# Patient Record
Sex: MALE | Race: WHITE | NOT HISPANIC OR LATINO | Employment: OTHER | URBAN - NONMETROPOLITAN AREA
[De-identification: names, ages, dates, MRNs, and addresses within clinical notes are randomized per-mention and may not be internally consistent; named-entity substitution may affect disease eponyms.]

---

## 2018-10-16 ENCOUNTER — CONSULT (OUTPATIENT)
Dept: CARDIOLOGY | Facility: CLINIC | Age: 83
End: 2018-10-16

## 2018-10-16 VITALS
WEIGHT: 219 LBS | HEART RATE: 78 BPM | SYSTOLIC BLOOD PRESSURE: 140 MMHG | HEIGHT: 69 IN | DIASTOLIC BLOOD PRESSURE: 80 MMHG | BODY MASS INDEX: 32.44 KG/M2

## 2018-10-16 DIAGNOSIS — R12 HEART BURN: ICD-10-CM

## 2018-10-16 DIAGNOSIS — R94.31 ABNORMAL EKG: ICD-10-CM

## 2018-10-16 DIAGNOSIS — R06.02 SHORTNESS OF BREATH: ICD-10-CM

## 2018-10-16 DIAGNOSIS — E88.81 METABOLIC SYNDROME: ICD-10-CM

## 2018-10-16 DIAGNOSIS — I10 ESSENTIAL HYPERTENSION: Primary | ICD-10-CM

## 2018-10-16 DIAGNOSIS — R01.1 MURMUR, CARDIAC: ICD-10-CM

## 2018-10-16 DIAGNOSIS — I25.6 SILENT MYOCARDIAL ISCHEMIA: ICD-10-CM

## 2018-10-16 PROCEDURE — 99204 OFFICE O/P NEW MOD 45 MIN: CPT | Performed by: INTERNAL MEDICINE

## 2018-10-16 PROCEDURE — 93000 ELECTROCARDIOGRAM COMPLETE: CPT | Performed by: INTERNAL MEDICINE

## 2018-10-16 RX ORDER — HYDROCHLOROTHIAZIDE 25 MG/1
25 TABLET ORAL DAILY
COMMUNITY
End: 2019-01-17

## 2018-10-16 RX ORDER — GABAPENTIN 100 MG/1
100 CAPSULE ORAL DAILY
COMMUNITY
End: 2019-01-17

## 2018-10-16 NOTE — PATIENT INSTRUCTIONS
Mediterranean Diet  A Mediterranean diet refers to food and lifestyle choices that are based on the traditions of countries located on the [place]. This way of eating has been shown to help prevent certain conditions and improve outcomes for people who have chronic diseases, like kidney disease and heart disease.  What are tips for following this plan?  Lifestyle  · Cook and eat meals together with your family, when possible.  · Drink enough fluid to keep your urine clear or pale yellow.  · Be physically active every day. This includes:  ? Aerobic exercise like running or swimming.  ? Leisure activities like gardening, walking, or housework.  · Get 7-8 hours of sleep each night.  · If recommended by your health care provider, drink red wine in moderation. This means 1 glass a day for nonpregnant women and 2 glasses a day for men. A glass of wine equals 5 oz (150 mL).  Reading food labels  · Check the serving size of packaged foods. For foods such as rice and pasta, the serving size refers to the amount of cooked product, not dry.  · Check the total fat in packaged foods. Avoid foods that have saturated fat or trans fats.  · Check the ingredients list for added sugars, such as corn syrup.  Shopping  · At the grocery store, buy most of your food from the areas near the walls of the store. This includes:  ? Fresh fruits and vegetables (produce).  ? Grains, beans, nuts, and seeds. Some of these may be available in unpackaged forms or large amounts (in bulk).  ? Fresh seafood.  ? Poultry and eggs.  ? Low-fat dairy products.  · Buy whole ingredients instead of prepackaged foods.  · Buy fresh fruits and vegetables in-season from local farmers markets.  · Buy frozen fruits and vegetables in resealable bags.  · If you do not have access to quality fresh seafood, buy precooked frozen shrimp or canned fish, such as tuna, salmon, or sardines.  · Buy small amounts of raw or cooked vegetables, salads, or olives from the  deli or salad bar at your store.  · Stock your pantry so you always have certain foods on hand, such as olive oil, canned tuna, canned tomatoes, rice, pasta, and beans.  Cooking  · Cook foods with extra-virgin olive oil instead of using butter or other vegetable oils.  · Have meat as a side dish, and have vegetables or grains as your main dish. This means having meat in small portions or adding small amounts of meat to foods like pasta or stew.  · Use beans or vegetables instead of meat in common dishes like chili or lasagna.  · Essig with different cooking methods. Try roasting or broiling vegetables instead of steaming or sautéeing them.  · Add frozen vegetables to soups, stews, pasta, or rice.  · Add nuts or seeds for added healthy fat at each meal. You can add these to yogurt, salads, or vegetable dishes.  · Marinate fish or vegetables using olive oil, lemon juice, garlic, and fresh herbs.  Meal planning  · Plan to eat 1 vegetarian meal one day each week. Try to work up to 2 vegetarian meals, if possible.  · Eat seafood 2 or more times a week.  · Have healthy snacks readily available, such as:  ? Vegetable sticks with hummus.  ? Greek yogurt.  ? Fruit and nut trail mix.  · Eat balanced meals throughout the week. This includes:  ? Fruit: 2-3 servings a day  ? Vegetables: 4-5 servings a day  ? Low-fat dairy: 2 servings a day  ? Fish, poultry, or lean meat: 1 serving a day  ? Beans and legumes: 2 or more servings a week  ? Nuts and seeds: 1-2 servings a day  ? Whole grains: 6-8 servings a day  ? Extra-virgin olive oil: 3-4 servings a day  · Limit red meat and sweets to only a few servings a month  What are my food choices?  · Mediterranean diet  ? Recommended  ? Grains: Whole-grain pasta. Brown rice. Bulgar wheat. Polenta. Couscous. Whole-wheat bread. Oatmeal. Quinoa.  ? Vegetables: Artichokes. Beets. Broccoli. Cabbage. Carrots. Eggplant. Green beans. Chard. Kale. Spinach. Onions. Leeks. Peas. Squash.  Tomatoes. Peppers. Radishes.  ? Fruits: Apples. Apricots. Avocado. Berries. Bananas. Cherries. Dates. Figs. Grapes. Simran. Melon. Oranges. Peaches. Plums. Pomegranate.  ? Meats and other protein foods: Beans. Almonds. Sunflower seeds. Pine nuts. Peanuts. Cod. [place]. Scallops. Shrimp. Tuna. Tilapia. Clams. Oysters. Eggs.  ? Dairy: Low-fat milk. Cheese. Greek yogurt.  ? Beverages: Water. Red wine. Herbal tea.  ? Fats and oils: Extra virgin olive oil. Avocado oil. Grape seed oil.  ? Sweets and desserts: Greek yogurt with honey. Baked apples. Poached pears. Trail mix.  ? Seasoning and other foods: Basil. Cilantro. Coriander. Cumin. Mint. Parsley. Kirk. Rosemary. Tarragon. Garlic. Oregano. Thyme. Pepper. Balsalmic vinegar. Tahini. Hummus. Tomato sauce. Olives. Mushrooms.  ? Limit these  ? Grains: Prepackaged pasta or rice dishes. Prepackaged cereal with added sugar.  ? Vegetables: Deep fried potatoes (french fries).  ? Fruits: Fruit canned in syrup.  ? Meats and other protein foods: Beef. Pork. Lamb. Poultry with skin. Hot dogs. Campbell.  ? Dairy: Ice cream. Sour cream. Whole milk.  ? Beverages: Juice. Sugar-sweetened soft drinks. Beer. Liquor and spirits.  ? Fats and oils: Butter. Canola oil. Vegetable oil. Beef fat (tallow). Lard.  ? Sweets and desserts: Cookies. Cakes. Pies. Candy.  ? Seasoning and other foods: Mayonnaise. Premade sauces and marinades.  ? The items listed may not be a complete list. Talk with your dietitian about what dietary choices are right for you.  Summary  · The Mediterranean diet includes both food and lifestyle choices.  · Eat a variety of fresh fruits and vegetables, beans, nuts, seeds, and whole grains.  · Limit the amount of red meat and sweets that you eat.  · Talk with your health care provider about whether it is safe for you to drink red wine in moderation. This means 1 glass a day for nonpregnant women and 2 glasses a day for men. A glass of wine equals 5 oz (150 mL).  This information  is not intended to replace advice given to you by your health care provider. Make sure you discuss any questions you have with your health care provider.  Document Released: 08/10/2017 Document Revised: 09/12/2017 Document Reviewed: 08/10/2017  [employer] Interactive Patient Education © 2018 [employer]

## 2018-10-16 NOTE — PROGRESS NOTES
Chief Complaint   Patient presents with   • Cardiac Clearance     Referred for clearance for left total hip replacement.  Scheduled 11/12/18 at [facility].  Dr. Alcantar.   • Abnormal EKG     I have called and requested ekg.  Per Dr. Perea note, ekg showed diffuse ST-T changes and Q-waves inferolaterally and anteroseptally   • Shortness of Breath     dyspnea on exertion.  patient has been taking HCTZ for a few weeks.  It was not prescribed. It was his wife.  She passed away 3 weeks ago.   Denies CP.  occ takes Tums for gas pain.  Denies palpitations.  Denies dizziness.   • ASA     not on ASA regularly.   He reports in past when he takes ASA he has HA.   • Labs     Labs done yesterday with Dr. Perea        CARDIAC COMPLAINTS  dyspnea and Pre Op clearance.      Prasanna Moctezuma is a 89 y.o. male came in today with his son for the initial cardiac evaluation.  He has no previously diagnosed cardiovascular problem.  He has undergone right hip surgery almost 15 years ago and again about 4 years ago without any problem.  He is now having problem with his left hip and is scheduled to undergo total hip replacement.  He has been under a lot of stress test recently.  He has been taking care of his sick wife who has passed away about 3 weeks ago.  He again about 20-30 pounds while he was taking care of her.  He has been noticing some dyspnea on exertion and also bilateral leg swelling.  He started taking HCTZ for the same.  It was his wife's medications.  He did notice that the swelling has come down with it.  He was seen at your office as today and the EKG did look abnormal.  Is now referred for further evaluation.  He denies having any chest pain.  He does complain of having heartburn for which he takes Tums.  He denies any nausea or vomiting.  He denies having any melena.  He denies having any orthopnea.  He quit smoking in 1963.  He doesn't have any significant family history of ischemic heart  disease.    History reviewed. No pertinent surgical history.    Current Outpatient Prescriptions   Medication Sig Dispense Refill   • Acetaminophen (TYLENOL ARTHRITIS PAIN PO) Take  by mouth As Needed.     • gabapentin (NEURONTIN) 100 MG capsule Take 100 mg by mouth Daily.     • hydrochlorothiazide (HYDRODIURIL) 25 MG tablet Take 25 mg by mouth Daily.       No current facility-administered medications for this visit.            ALLERGIES:  Ciprofloxacin    Past Medical History:   Diagnosis Date   • GERD (gastroesophageal reflux disease)    • History of cholecystectomy    • History of hernia surgery     x 3   • History of right hip replacement    • Hyperlipidemia    • Melanoma (CMS/HCC)     Removed from nose   • Osteoarthritis        History   Smoking Status   • Former Smoker   • Packs/day: 1.00   • Years: 34.00   • Quit date: 1963   Smokeless Tobacco   • Never Used          Family History   Problem Relation Age of Onset   • No Known Problems Mother        Review of Systems   Constitution: Positive for malaise/fatigue and weight gain. Negative for decreased appetite.   HENT: Negative for congestion and sore throat.    Eyes: Negative for blurred vision.   Cardiovascular: Positive for dyspnea on exertion and leg swelling. Negative for chest pain.   Respiratory: Positive for shortness of breath. Negative for snoring.    Endocrine: Negative for cold intolerance and heat intolerance.   Hematologic/Lymphatic: Negative for adenopathy. Does not bruise/bleed easily.   Skin: Negative for itching, nail changes and skin cancer.   Musculoskeletal: Positive for arthritis and joint pain. Negative for myalgias.   Gastrointestinal: Negative for abdominal pain, dysphagia and heartburn.   Genitourinary: Negative for bladder incontinence and frequency.   Neurological: Negative for dizziness, light-headedness, seizures and vertigo.   Psychiatric/Behavioral: Negative for altered mental status.   Allergic/Immunologic: Negative for  "environmental allergies and hives.       Diabetes- No  Thyroid- normal    Objective     /80 (BP Location: Left arm)   Pulse 78   Ht 175.3 cm (69\")   Wt 99.3 kg (219 lb)   BMI 32.34 kg/m²     Physical Exam   Constitutional: He is oriented to person, place, and time. He appears well-developed and well-nourished.   HENT:   Head: Normocephalic.   Nose: Nose normal.   Eyes: Pupils are equal, round, and reactive to light. EOM are normal.   Neck: Normal range of motion. Neck supple.   Cardiovascular: Normal rate, regular rhythm, S1 normal and S2 normal.    Murmur heard.  Pulmonary/Chest: Effort normal and breath sounds normal.   Abdominal: Soft. Bowel sounds are normal.   Musculoskeletal: Normal range of motion. He exhibits no edema.   Neurological: He is alert and oriented to person, place, and time.   Skin: Skin is warm and dry.   Psychiatric: He has a normal mood and affect.         ECG 12 Lead  Date/Time: 10/16/2018 12:42 PM  Performed by: MAYE VALDIVIA  Authorized by: MAYE VALDIVIA   Comparison: compared with previous ECG from 10/15/2018  Similar to previous ECG  Rhythm: sinus rhythm  Rate: normal  Conduction: incomplete RBBB  QRS axis: normal  Clinical impression: non-specific ECG              Assessment/Plan   Patient's Body mass index is 32.34 kg/m². BMI is above normal parameters. Recommendations include: educational material, exercise counseling and nutrition counseling.     Juarez was seen today for cardiac clearance, abnormal ekg, shortness of breath, asa and labs.    Diagnoses and all orders for this visit:    Essential hypertension    Shortness of breath  -     Stress Test With Myocardial Perfusion One Day; Future  -     Adult Transthoracic Echo Complete W/ Cont if Necessary Per Protocol; Future    Abnormal EKG  -     Stress Test With Myocardial Perfusion One Day; Future    Metabolic syndrome    Murmur, cardiac  -     Adult Transthoracic Echo Complete W/ Cont if Necessary Per Protocol; " Future    Silent myocardial ischemia  -     Stress Test With Myocardial Perfusion One Day; Future    Heart burn  -     Stress Test With Myocardial Perfusion One Day; Future       At baseline, his heart rate is stable.  His blood pressure is slightly elevated.  His EKG showed normal sinus rhythm, incomplete right bundle branch block and ST-T changes in the inferior leads.  His BMI is 32.  His clinical examination is unremarkable other than systolic murmur at the mitral area.  He doesn't have any significant pedal edema today.  His lab work was done at your office yesterday and I don't have the results.  I had a long talk with him and his son about the EKG and the symptoms of increasing shortness of breath and edema.  He needs to undergo an echocardiogram to evaluate the LV function, valvular structures, PA pressure.  Given his abnormal EKG and the history of heartburn, ischemic heart disease also needs to be ruled out.  I scheduled him to undergo a stress test in the form of Lexiscan to rule out any significant ischemia.  I did not start him on any aspirin at this time.  Apparently when he takes aspirin he does have problem in the form of headache.  Based on the results of these tests, further recommendations will be made.  Regarding his BMI, talked to him about cutting down on the salt intake and also reduce carbohydrate.  I gave him papers on Mediterranean diet.             Electronically signed by Brad Ramsey MD October 16, 2018 12:33 PM

## 2018-10-18 ENCOUNTER — HOSPITAL ENCOUNTER (OUTPATIENT)
Dept: CARDIOLOGY | Facility: HOSPITAL | Age: 83
Discharge: HOME | End: 2018-10-18
Attending: INTERNAL MEDICINE

## 2018-10-18 ENCOUNTER — HOSPITAL ENCOUNTER (OUTPATIENT)
Dept: CARDIOLOGY | Facility: HOSPITAL | Age: 83
Discharge: HOME | End: 2018-10-18
Attending: INTERNAL MEDICINE | Admitting: INTERNAL MEDICINE

## 2018-10-18 DIAGNOSIS — R94.31 ABNORMAL EKG: ICD-10-CM

## 2018-10-18 DIAGNOSIS — I25.6 SILENT MYOCARDIAL ISCHEMIA: ICD-10-CM

## 2018-10-18 DIAGNOSIS — R06.02 SHORTNESS OF BREATH: ICD-10-CM

## 2018-10-18 DIAGNOSIS — R12 HEART BURN: ICD-10-CM

## 2018-10-18 DIAGNOSIS — R01.1 MURMUR, CARDIAC: ICD-10-CM

## 2018-10-18 LAB
BH CV ECHO MEAS - ACS: 2.1 CM
BH CV ECHO MEAS - AO MEAN PG: 4.1 MMHG
BH CV ECHO MEAS - AO ROOT AREA (BSA CORRECTED): 1.4
BH CV ECHO MEAS - AO ROOT AREA: 6.8 CM^2
BH CV ECHO MEAS - AO ROOT DIAM: 3 CM
BH CV ECHO MEAS - AO V2 MEAN: 96.6 CM/SEC
BH CV ECHO MEAS - AO V2 VTI: 29.1 CM
BH CV ECHO MEAS - BSA(HAYCOCK): 2.2 M^2
BH CV ECHO MEAS - BSA: 2.1 M^2
BH CV ECHO MEAS - BZI_BMI: 32.3 KILOGRAMS/M^2
BH CV ECHO MEAS - BZI_METRIC_HEIGHT: 175.3 CM
BH CV ECHO MEAS - BZI_METRIC_WEIGHT: 99.3 KG
BH CV ECHO MEAS - EDV(CUBED): 62.6 ML
BH CV ECHO MEAS - EDV(MOD-SP4): 68 ML
BH CV ECHO MEAS - EDV(TEICH): 68.8 ML
BH CV ECHO MEAS - EF(CUBED): 67.8 %
BH CV ECHO MEAS - EF(MOD-SP4): 61.8 %
BH CV ECHO MEAS - EF(TEICH): 60 %
BH CV ECHO MEAS - ESV(CUBED): 20.1 ML
BH CV ECHO MEAS - ESV(MOD-SP4): 26 ML
BH CV ECHO MEAS - ESV(TEICH): 27.5 ML
BH CV ECHO MEAS - FS: 31.5 %
BH CV ECHO MEAS - IVS/LVPW: 1
BH CV ECHO MEAS - IVSD: 1.4 CM
BH CV ECHO MEAS - LA DIMENSION: 3.4 CM
BH CV ECHO MEAS - LA/AO: 1.2
BH CV ECHO MEAS - LV DIASTOLIC VOL/BSA (35-75): 31.7 ML/M^2
BH CV ECHO MEAS - LV IVRT: 0.12 SEC
BH CV ECHO MEAS - LV MASS(C)D: 209.3 GRAMS
BH CV ECHO MEAS - LV MASS(C)DI: 97.5 GRAMS/M^2
BH CV ECHO MEAS - LV SYSTOLIC VOL/BSA (12-30): 12.1 ML/M^2
BH CV ECHO MEAS - LVIDD: 4 CM
BH CV ECHO MEAS - LVIDS: 2.7 CM
BH CV ECHO MEAS - LVLD AP4: 7.2 CM
BH CV ECHO MEAS - LVLS AP4: 6.1 CM
BH CV ECHO MEAS - LVOT AREA (M): 3.1 CM^2
BH CV ECHO MEAS - LVOT AREA: 3.2 CM^2
BH CV ECHO MEAS - LVOT DIAM: 2 CM
BH CV ECHO MEAS - LVPWD: 1.4 CM
BH CV ECHO MEAS - MV A MAX VEL: 76.5 CM/SEC
BH CV ECHO MEAS - MV DEC SLOPE: 204.6 CM/SEC^2
BH CV ECHO MEAS - MV E MAX VEL: 57.8 CM/SEC
BH CV ECHO MEAS - MV E/A: 0.75
BH CV ECHO MEAS - MV MEAN PG: 1.3 MMHG
BH CV ECHO MEAS - MV P1/2T MAX VEL: 66.5 CM/SEC
BH CV ECHO MEAS - MV P1/2T: 95.2 MSEC
BH CV ECHO MEAS - MV V2 MEAN: 54.4 CM/SEC
BH CV ECHO MEAS - MV V2 VTI: 19.6 CM
BH CV ECHO MEAS - MVA P1/2T LCG: 3.3 CM^2
BH CV ECHO MEAS - MVA(P1/2T): 2.3 CM^2
BH CV ECHO MEAS - RAP SYSTOLE: 10 MMHG
BH CV ECHO MEAS - RVDD: 3.1 CM
BH CV ECHO MEAS - RVSP: 33.9 MMHG
BH CV ECHO MEAS - SI(AO): 92.7 ML/M^2
BH CV ECHO MEAS - SI(CUBED): 19.8 ML/M^2
BH CV ECHO MEAS - SI(MOD-SP4): 19.6 ML/M^2
BH CV ECHO MEAS - SI(TEICH): 19.2 ML/M^2
BH CV ECHO MEAS - SV(AO): 199 ML
BH CV ECHO MEAS - SV(CUBED): 42.5 ML
BH CV ECHO MEAS - SV(MOD-SP4): 42 ML
BH CV ECHO MEAS - SV(TEICH): 41.3 ML
BH CV ECHO MEAS - TR MAX VEL: 244.3 CM/SEC
BH CV NUCLEAR PRIOR STUDY: 3
BH CV STRESS COMMENTS STAGE 1: NORMAL
BH CV STRESS DOSE REGADENOSON STAGE 1: 0.4
BH CV STRESS DURATION MIN STAGE 1: 0
BH CV STRESS DURATION SEC STAGE 1: 10
BH CV STRESS PROTOCOL 1: NORMAL
BH CV STRESS RECOVERY BP: NORMAL MMHG
BH CV STRESS RECOVERY HR: 93 BPM
BH CV STRESS STAGE 1: 1
LV EF NUC BP: 72 %
MAXIMAL PREDICTED HEART RATE: 131 BPM
MAXIMAL PREDICTED HEART RATE: 131 BPM
PERCENT MAX PREDICTED HR: 70.99 %
STRESS BASELINE BP: NORMAL MMHG
STRESS BASELINE HR: 80 BPM
STRESS PERCENT HR: 84 %
STRESS POST PEAK BP: NORMAL MMHG
STRESS POST PEAK HR: 93 BPM
STRESS TARGET HR: 111 BPM
STRESS TARGET HR: 111 BPM

## 2018-10-18 PROCEDURE — 78452 HT MUSCLE IMAGE SPECT MULT: CPT

## 2018-10-18 PROCEDURE — 78452 HT MUSCLE IMAGE SPECT MULT: CPT | Performed by: INTERNAL MEDICINE

## 2018-10-18 PROCEDURE — 93017 CV STRESS TEST TRACING ONLY: CPT

## 2018-10-18 PROCEDURE — A9500 TC99M SESTAMIBI: HCPCS | Performed by: INTERNAL MEDICINE

## 2018-10-18 PROCEDURE — 0 TECHNETIUM SESTAMIBI: Performed by: INTERNAL MEDICINE

## 2018-10-18 PROCEDURE — 93306 TTE W/DOPPLER COMPLETE: CPT | Performed by: INTERNAL MEDICINE

## 2018-10-18 PROCEDURE — 93018 CV STRESS TEST I&R ONLY: CPT | Performed by: INTERNAL MEDICINE

## 2018-10-18 PROCEDURE — 93306 TTE W/DOPPLER COMPLETE: CPT

## 2018-10-18 PROCEDURE — 25010000002 REGADENOSON 0.4 MG/5ML SOLUTION: Performed by: INTERNAL MEDICINE

## 2018-10-18 RX ADMIN — TECHNETIUM TC 99M SESTAMIBI 1 DOSE: 1 INJECTION INTRAVENOUS at 09:40

## 2018-10-18 RX ADMIN — TECHNETIUM TC 99M SESTAMIBI 1 DOSE: 1 INJECTION INTRAVENOUS at 11:30

## 2018-10-18 RX ADMIN — REGADENOSON 0.4 MG: 0.08 INJECTION, SOLUTION INTRAVENOUS at 11:25

## 2018-11-08 ENCOUNTER — TELEPHONE (OUTPATIENT)
Dept: CARDIOLOGY | Facility: CLINIC | Age: 83
End: 2018-11-08

## 2018-11-08 NOTE — TELEPHONE ENCOUNTER
Hortencia from [facility] called requesting cardiac clearance for surgery, advised clear per stress.  Copy of stress sent to [facility] ATT Hortencia.

## 2019-01-17 ENCOUNTER — OFFICE VISIT (OUTPATIENT)
Dept: CARDIOLOGY | Facility: CLINIC | Age: 84
End: 2019-01-17

## 2019-01-17 VITALS
SYSTOLIC BLOOD PRESSURE: 136 MMHG | HEIGHT: 69 IN | BODY MASS INDEX: 30.81 KG/M2 | HEART RATE: 92 BPM | DIASTOLIC BLOOD PRESSURE: 68 MMHG | WEIGHT: 208 LBS

## 2019-01-17 DIAGNOSIS — R06.02 SHORTNESS OF BREATH: ICD-10-CM

## 2019-01-17 DIAGNOSIS — R53.82 CHRONIC FATIGUE: ICD-10-CM

## 2019-01-17 DIAGNOSIS — R60.0 BILATERAL LEG EDEMA: ICD-10-CM

## 2019-01-17 DIAGNOSIS — I10 ESSENTIAL HYPERTENSION: Primary | ICD-10-CM

## 2019-01-17 DIAGNOSIS — G47.30 SLEEP APNEA IN ADULT: ICD-10-CM

## 2019-01-17 DIAGNOSIS — E88.81 METABOLIC SYNDROME: ICD-10-CM

## 2019-01-17 DIAGNOSIS — R01.1 MURMUR, CARDIAC: ICD-10-CM

## 2019-01-17 PROCEDURE — 99213 OFFICE O/P EST LOW 20 MIN: CPT | Performed by: INTERNAL MEDICINE

## 2019-01-17 RX ORDER — POTASSIUM CHLORIDE 750 MG/1
10 TABLET, EXTENDED RELEASE ORAL DAILY
COMMUNITY
End: 2019-01-17 | Stop reason: SDUPTHER

## 2019-01-17 RX ORDER — TAMSULOSIN HYDROCHLORIDE 0.4 MG/1
1 CAPSULE ORAL NIGHTLY
COMMUNITY

## 2019-01-17 RX ORDER — POTASSIUM CHLORIDE 750 MG/1
10 TABLET, EXTENDED RELEASE ORAL DAILY
Qty: 90 TABLET | Refills: 3 | Status: SHIPPED | OUTPATIENT
Start: 2019-01-17 | End: 2021-05-27 | Stop reason: ALTCHOICE

## 2019-01-17 RX ORDER — AMOXICILLIN 500 MG/1
1000 CAPSULE ORAL 3 TIMES DAILY
COMMUNITY
End: 2019-07-31 | Stop reason: ALTCHOICE

## 2019-01-17 RX ORDER — DOCUSATE SODIUM 100 MG/1
100 CAPSULE, LIQUID FILLED ORAL DAILY
COMMUNITY

## 2019-01-17 RX ORDER — FUROSEMIDE 40 MG/1
40 TABLET ORAL DAILY
COMMUNITY

## 2019-01-17 RX ORDER — ASPIRIN 81 MG/1
81 TABLET ORAL DAILY
COMMUNITY
End: 2019-07-31 | Stop reason: ALTCHOICE

## 2019-01-17 RX ORDER — DUTASTERIDE 0.5 MG/1
0.5 CAPSULE, LIQUID FILLED ORAL DAILY
COMMUNITY

## 2019-01-17 NOTE — PROGRESS NOTES
Chief Complaint   Patient presents with   • Follow-up     for cardiac management   • Med Refill     asking for refills on K+,    • Shortness of Breath     with exertion   • Nephrologist     admitted to hospital soon after his consult here with fluid overload. Will pull records. Saw  him today, heard crackles in his lungs, told him to double his Lasix for 2 days. He does have a cold but Dr Selby didn't feel like    • Hip replacement     Hip replacement in Nov. Surgeon told him to take ASA 81mg BID, pt has decreased back to one a day due to frequent headaches. Still having headaches after decreasing, asking if he could  stop the aspirin.        CARDIAC COMPLAINTS  dyspnea, fatigue and lower extremity edema        Subjective   Juarez Moctezuma is a 89 y.o. male came in today for his follow-up visit.  He was referred to me for abnormal EKG and pre op clearance for hip surgery.  His cardiac workup was within normal limit.  Clearance was given and underwent surgery without any problem.  He did have some postoperative bleeding by the did not require any blood transfusion.  He was in special care unit for few weeks.  He did have some problem urinating and needed a Melo catheter.  He came today feeling better.  He is able to urinate without problem.  He was found to have possible sleep apnea and using oxygen now at night.  Is still has some shortness of breath and mild leg edema.  He was seen by Dr. Selby today and was advised to increase his Lasix for the next few days.  He is not sure when was the last time he had any labs done.            Cardiac History  Past Surgical History:   Procedure Laterality Date   • CARDIOVASCULAR STRESS TEST  10/18/2018    L.Cardiolite- Negative   • ECHO - CONVERTED  10/18/2018    EF 65%       Current Outpatient Medications   Medication Sig Dispense Refill   • Acetaminophen (TYLENOL ARTHRITIS PAIN PO) Take  by mouth As Needed.     • amoxicillin (AMOXIL) 500 MG capsule Take 1,000 mg by  mouth 3 (Three) Times a Day.     • aspirin 81 MG EC tablet Take 81 mg by mouth Daily.     • docusate sodium (COLACE) 100 MG capsule Take 100 mg by mouth 2 (Two) Times a Day.     • dutasteride (AVODART) 0.5 MG capsule Take 0.5 mg by mouth Daily.     • furosemide (LASIX) 40 MG tablet Take 40 mg by mouth Daily.     • potassium chloride (K-DUR,KLOR-CON) 10 MEQ CR tablet Take 1 tablet by mouth Daily. 90 tablet 3   • tamsulosin (FLOMAX) 0.4 MG capsule 24 hr capsule Take 1 capsule by mouth Every Night.       No current facility-administered medications for this visit.        Allergies  :  Ciprofloxacin       Past Medical History:   Diagnosis Date   • GERD (gastroesophageal reflux disease)    • History of cholecystectomy    • History of hernia surgery     x 3   • History of right hip replacement    • Hyperlipidemia    • Melanoma (CMS/HCC)     Removed from nose   • Osteoarthritis        Social History     Socioeconomic History   • Marital status:      Spouse name: Not on file   • Number of children: Not on file   • Years of education: Not on file   • Highest education level: Not on file   Social Needs   • Financial resource strain: Not on file   • Food insecurity - worry: Not on file   • Food insecurity - inability: Not on file   • Transportation needs - medical: Not on file   • Transportation needs - non-medical: Not on file   Occupational History   • Not on file   Tobacco Use   • Smoking status: Former Smoker     Packs/day: 1.00     Years: 34.00     Pack years: 34.00     Last attempt to quit:      Years since quittin.0   • Smokeless tobacco: Never Used   Substance and Sexual Activity   • Alcohol use: No   • Drug use: No   • Sexual activity: Defer   Other Topics Concern   • Not on file   Social History Narrative   • Not on file       Family History   Problem Relation Age of Onset   • No Known Problems Mother        Review of Systems   Constitution: Positive for malaise/fatigue. Negative for decreased  "appetite.   HENT: Negative for congestion and sore throat.    Eyes: Negative for blurred vision.   Cardiovascular: Positive for dyspnea on exertion and leg swelling. Negative for chest pain.   Respiratory: Positive for shortness of breath. Negative for snoring.    Endocrine: Negative for cold intolerance and heat intolerance.   Hematologic/Lymphatic: Negative for adenopathy. Does not bruise/bleed easily.   Skin: Negative for itching, nail changes and skin cancer.   Musculoskeletal: Positive for arthritis and joint pain. Negative for myalgias.   Gastrointestinal: Negative for abdominal pain, dysphagia and heartburn.   Genitourinary: Negative for bladder incontinence and frequency.   Neurological: Negative for dizziness, light-headedness, seizures and vertigo.   Psychiatric/Behavioral: Negative for altered mental status.   Allergic/Immunologic: Negative for environmental allergies and hives.       Diabetes- No  Thyroid- normal    Objective     /68   Pulse 92   Ht 175.3 cm (69\")   Wt 94.3 kg (208 lb)   BMI 30.72 kg/m²     Physical Exam   Constitutional: He is oriented to person, place, and time. He appears well-developed and well-nourished.   HENT:   Head: Normocephalic.   Nose: Nose normal.   Eyes: EOM are normal. Pupils are equal, round, and reactive to light.   Neck: Normal range of motion. Neck supple.   Cardiovascular: Normal rate, regular rhythm, S1 normal and S2 normal.   Murmur heard.  Pulmonary/Chest: Effort normal and breath sounds normal.   Abdominal: Soft. Bowel sounds are normal.   Musculoskeletal: Normal range of motion. He exhibits edema.   Neurological: He is alert and oriented to person, place, and time.   Skin: Skin is warm and dry.   Psychiatric: He has a normal mood and affect.     Procedures            Assessment/Plan   Patient's Body mass index is 30.72 kg/m². BMI is above normal parameters. Recommendations include: nutrition counseling.     Juarez was seen today for follow-up, med " refill, shortness of breath, nephrologist and hip replacement.    Diagnoses and all orders for this visit:    Essential hypertension  -     Comprehensive Metabolic Panel; Future    Murmur, cardiac    Shortness of breath  -     CBC & Differential; Future  -     proBNP; Future    Metabolic syndrome    Chronic fatigue    Sleep apnea in adult    Bilateral leg edema  -     proBNP; Future  -     potassium chloride (K-DUR,KLOR-CON) 10 MEQ CR tablet; Take 1 tablet by mouth Daily.         At baseline his heart rate and blood pressure appears stable.  His BMI is come down to 31.  His clinical examination is unremarkable other than the occasional rhonchi and is soft systolic murmur at the mitral area.  At this time continue the same medications.  I did advise him to check his labs including CBC, CMP and BNP.  Overall his cardiac status is stable.  I explained to him and his son about the stress and echo findings.  I will see him back in 6 months or sooner if needed.            Electronically signed by Brad Ramsey MD January 17, 2019 3:52 PM

## 2019-01-17 NOTE — PROGRESS NOTES
Chief Complaint   Patient presents with   • Follow-up     for cardiac management   • Med Refill     asking for refills on K+,    • Shortness of Breath     with exertion   • Nephrologist     admitted to hospital soon after his consult here with fluid overload. Will pull records. Saw  him today, heard crackles in his lungs, told him to double his Lasix for 2 days. He does have a cold but Dr Selby didn't feel like    • Hip replacement     Hip replacement in Nov. Surgeon told him to take ASA 81mg BID, pt has decreased back to one a day due to frequent headaches. Still having headaches after decreasing, asking if he could  stop the aspirin.        CARDIAC COMPLAINTS  dyspnea and lower extremity edema        Subjective   Juarez Moctezuma is a 89 y.o. male               Cardiac History  Past Surgical History:   Procedure Laterality Date   • CARDIOVASCULAR STRESS TEST  10/18/2018    L.Cardiolite- Negative   • ECHO - CONVERTED  10/18/2018    EF 65%       Current Outpatient Medications   Medication Sig Dispense Refill   • Acetaminophen (TYLENOL ARTHRITIS PAIN PO) Take  by mouth As Needed.     • amoxicillin (AMOXIL) 500 MG capsule Take 1,000 mg by mouth 3 (Three) Times a Day.     • aspirin 81 MG EC tablet Take 81 mg by mouth Daily.     • docusate sodium (COLACE) 100 MG capsule Take 100 mg by mouth 2 (Two) Times a Day.     • dutasteride (AVODART) 0.5 MG capsule Take 0.5 mg by mouth Daily.     • furosemide (LASIX) 40 MG tablet Take 40 mg by mouth Daily.     • potassium chloride (K-DUR,KLOR-CON) 10 MEQ CR tablet Take 1 tablet by mouth Daily. 90 tablet 3   • tamsulosin (FLOMAX) 0.4 MG capsule 24 hr capsule Take 1 capsule by mouth Every Night.       No current facility-administered medications for this visit.        Allergies  :  Ciprofloxacin       Past Medical History:   Diagnosis Date   • GERD (gastroesophageal reflux disease)    • History of cholecystectomy    • History of hernia surgery     x 3   • History of right hip  "replacement    • Hyperlipidemia    • Melanoma (CMS/HCC)     Removed from nose   • Osteoarthritis        Social History     Socioeconomic History   • Marital status:      Spouse name: Not on file   • Number of children: Not on file   • Years of education: Not on file   • Highest education level: Not on file   Social Needs   • Financial resource strain: Not on file   • Food insecurity - worry: Not on file   • Food insecurity - inability: Not on file   • Transportation needs - medical: Not on file   • Transportation needs - non-medical: Not on file   Occupational History   • Not on file   Tobacco Use   • Smoking status: Former Smoker     Packs/day: 1.00     Years: 34.00     Pack years: 34.00     Last attempt to quit: 1963     Years since quittin.0   • Smokeless tobacco: Never Used   Substance and Sexual Activity   • Alcohol use: No   • Drug use: No   • Sexual activity: Defer   Other Topics Concern   • Not on file   Social History Narrative   • Not on file       Family History   Problem Relation Age of Onset   • No Known Problems Mother        ROS    Diabetes- No  Thyroid- normal    Objective     /68   Pulse 92   Ht 175.3 cm (69\")   Wt 94.3 kg (208 lb)   BMI 30.72 kg/m²     Physical Exam  Procedures            Assessment/Plan   Patient's Body mass index is 30.72 kg/m². BMI is above normal parameters. Recommendations include: nutrition counseling.     Juarez was seen today for follow-up, med refill, shortness of breath, nephrologist and hip replacement.    Diagnoses and all orders for this visit:    Essential hypertension  -     Comprehensive Metabolic Panel; Future    Murmur, cardiac    Shortness of breath  -     CBC & Differential; Future  -     proBNP; Future    Metabolic syndrome    Chronic fatigue    Sleep apnea in adult    Bilateral leg edema  -     proBNP; Future  -     potassium chloride (K-DUR,KLOR-CON) 10 MEQ CR tablet; Take 1 tablet by mouth Daily.                         Electronically " signed by Brad Ramsey MD January 17, 2019 3:52 PM

## 2019-07-31 ENCOUNTER — OFFICE VISIT (OUTPATIENT)
Dept: CARDIOLOGY | Facility: CLINIC | Age: 84
End: 2019-07-31

## 2019-07-31 VITALS
DIASTOLIC BLOOD PRESSURE: 64 MMHG | WEIGHT: 213.6 LBS | HEIGHT: 69 IN | HEART RATE: 76 BPM | SYSTOLIC BLOOD PRESSURE: 116 MMHG | BODY MASS INDEX: 31.64 KG/M2

## 2019-07-31 DIAGNOSIS — I10 ESSENTIAL HYPERTENSION: Primary | ICD-10-CM

## 2019-07-31 DIAGNOSIS — Z79.899 MEDICATION MANAGEMENT: ICD-10-CM

## 2019-07-31 DIAGNOSIS — E66.9 OBESITY (BMI 30.0-34.9): ICD-10-CM

## 2019-07-31 DIAGNOSIS — E88.81 METABOLIC SYNDROME: ICD-10-CM

## 2019-07-31 PROCEDURE — 99213 OFFICE O/P EST LOW 20 MIN: CPT | Performed by: NURSE PRACTITIONER

## 2019-07-31 RX ORDER — SERTRALINE HYDROCHLORIDE 25 MG/1
25 TABLET, FILM COATED ORAL DAILY
COMMUNITY
End: 2021-05-27 | Stop reason: ALTCHOICE

## 2020-02-04 ENCOUNTER — OFFICE VISIT (OUTPATIENT)
Dept: CARDIOLOGY | Facility: CLINIC | Age: 85
End: 2020-02-04

## 2020-02-04 VITALS
BODY MASS INDEX: 33.18 KG/M2 | HEIGHT: 69 IN | SYSTOLIC BLOOD PRESSURE: 112 MMHG | WEIGHT: 224 LBS | DIASTOLIC BLOOD PRESSURE: 60 MMHG | HEART RATE: 76 BPM

## 2020-02-04 DIAGNOSIS — R06.02 SHORTNESS OF BREATH: ICD-10-CM

## 2020-02-04 DIAGNOSIS — R60.0 BILATERAL LEG EDEMA: ICD-10-CM

## 2020-02-04 DIAGNOSIS — G47.30 SLEEP APNEA IN ADULT: ICD-10-CM

## 2020-02-04 DIAGNOSIS — I10 ESSENTIAL HYPERTENSION: Primary | ICD-10-CM

## 2020-02-04 PROCEDURE — 99213 OFFICE O/P EST LOW 20 MIN: CPT | Performed by: NURSE PRACTITIONER

## 2020-02-04 RX ORDER — MONTELUKAST SODIUM 10 MG/1
10 TABLET ORAL NIGHTLY
COMMUNITY

## 2020-02-04 RX ORDER — SPIRONOLACTONE 25 MG/1
25 TABLET ORAL DAILY
COMMUNITY

## 2020-02-04 NOTE — PROGRESS NOTES
Chief Complaint   Patient presents with   • Follow-up     Cardiac management.   • Lab     Last labs he thinks 4 months ago per PCP. PCP writes refills on medication.   • Shortness of Breath     Same as before.   • Edema     Having swelling in feet and ankles, PCP has started Spironolactone 25mg daily prn for swelling. He reports B/P 80s/40s for a brief time while taking lasix and spironolactone daily.     Prasanna Moctezuma is a 89 y.o. male referred in October 2018 secondary to abnormal EKG showing ST changes and for pre op clearance for hip surgery. He underwent Lexiscan stress and echocardiogram which showed no ischemia and normal LV function. Clearance was given and underwent surgery without any problem.  He was found to have possible sleep apnea and given oxygen but later discontinued. He had some shortness of breath and persistent LE edema in January 2019 with labs showing elevated WBC, normal renal function and normal BNP. He was advised to see PCP.  He reports he was treated with antibiotics. He came today for follow up. Denies chest pain and no significant shortness of breath. He continues to have LE edema. Swelling does improve somewhat overnight but worse in the day and worse with prolonged sitting. He has tried different diuretics, most recently spironolactone added to the Lasix. He did get a little hypotensive and uses spironolactone as needed. He admits to eating a little more sodium since his wife has passed away. Labs 1/2019: BUN/Cr 15/0.9, GFR 83, glucose 126, . Labs have been rechecked by PCP.     HPI         Cardiac History:    Past Surgical History:   Procedure Laterality Date   • CARDIOVASCULAR STRESS TEST  10/18/2018    L.Cardiolite- Negative   • ECHO - CONVERTED  10/18/2018    EF 65%     Current Outpatient Medications   Medication Sig Dispense Refill   • docusate sodium (COLACE) 100 MG capsule Take 100 mg by mouth Daily.     • dutasteride (AVODART) 0.5 MG capsule Take 0.5  "mg by mouth Daily.     • furosemide (LASIX) 40 MG tablet Take 40 mg by mouth Daily.     • montelukast (SINGULAIR) 10 MG tablet Take 10 mg by mouth Every Night.     • potassium chloride (K-DUR,KLOR-CON) 10 MEQ CR tablet Take 1 tablet by mouth Daily. 90 tablet 3   • sertraline (ZOLOFT) 25 MG tablet Take 25 mg by mouth Daily.     • spironolactone (ALDACTONE) 25 MG tablet Take 25 mg by mouth Daily As Needed.     • tamsulosin (FLOMAX) 0.4 MG capsule 24 hr capsule Take 1 capsule by mouth Every Night.       No current facility-administered medications for this visit.      Ciprofloxacin    Past Medical History:   Diagnosis Date   • GERD (gastroesophageal reflux disease)    • History of cholecystectomy    • History of hernia surgery     x 3   • History of right hip replacement    • Hyperlipidemia    • Melanoma (CMS/HCC)     Removed from nose   • Osteoarthritis      Social History     Socioeconomic History   • Marital status:      Spouse name: Not on file   • Number of children: Not on file   • Years of education: Not on file   • Highest education level: Not on file   Tobacco Use   • Smoking status: Former Smoker     Packs/day: 1.00     Years: 34.00     Pack years: 34.00     Last attempt to quit: 1963     Years since quittin.1   • Smokeless tobacco: Never Used   Substance and Sexual Activity   • Alcohol use: No   • Drug use: No   • Sexual activity: Defer     Family History   Problem Relation Age of Onset   • No Known Problems Mother      Review of Systems   Constitution: Positive for weight gain (up 11 lb ). Negative for decreased appetite and malaise/fatigue.   HENT: Negative.    Eyes: Negative.    Cardiovascular: Positive for dyspnea on exertion (mild ) and leg swelling. Negative for chest pain, palpitations and syncope.   Respiratory: Positive for shortness of breath (\"about the same\"). Negative for cough.    Endocrine: Negative.    Hematologic/Lymphatic: Negative.    Skin: Negative.    Musculoskeletal: " "Positive for arthritis, joint pain and stiffness. Negative for falls.   Gastrointestinal: Negative for abdominal pain and melena.   Genitourinary: Negative for hematuria.   Neurological: Negative for dizziness.   Psychiatric/Behavioral: Negative for altered mental status and depression.   Allergic/Immunologic: Negative.       Diabetes- No  Thyroid-normal    Objective     /60 (BP Location: Left arm)   Pulse 76   Ht 175.3 cm (69.02\")   Wt 102 kg (224 lb)   BMI 33.06 kg/m²     Physical Exam   Constitutional: He is oriented to person, place, and time. He appears well-developed and well-nourished. No distress.   HENT:   Head: Normocephalic.   Eyes: Pupils are equal, round, and reactive to light.   Neck: Normal range of motion.   Cardiovascular: Normal rate and regular rhythm.   Pulmonary/Chest: Effort normal and breath sounds normal. No respiratory distress. He has no wheezes. He has no rales.   Abdominal: Soft. Bowel sounds are normal.   Musculoskeletal: He exhibits edema (1+ bilateral LE edema ).   Neurological: He is alert and oriented to person, place, and time.   Skin: Skin is warm and dry. He is not diaphoretic.   Psychiatric: He has a normal mood and affect.   Nursing note and vitals reviewed.     Procedures          Assessment/Plan      Juarez was seen today for follow-up, lab, shortness of breath and edema.    Diagnoses and all orders for this visit:    Essential hypertension    Shortness of breath    Sleep apnea in adult    Bilateral leg edema      Heart rate and blood pressure well controlled. BMI elevated at 33. Cardiac work up showed no evidence of ischemia with normal LV function. He shows no sign of heart failure. He continues to have pedal edema which we discussed in detail. He is advised to 1- wear compression stockings, 2- limit sodium intake to 1500 mg daily, 3- elevate legs while sitting, 4- avoid prolonged sitting. He is not interested in any aggressive treatment. Continue the diuretic. He " may be better off taking spironolactone daily then using Lasix PRN. He did mention trying other loop diuretics (Bumex?) without much difference. I did not make any changes today as his labs are followed by you. From a cardiac standpoint, he appears stable. We will see him back in six months or sooner if he develops any new symptoms.     Patient's Body mass index is 33.06 kg/m². BMI is above normal parameters. Recommendations include: nutrition counseling.               Electronically signed by NAY Rivera,  February 7, 2020 6:35 PM

## 2020-08-04 ENCOUNTER — OFFICE VISIT (OUTPATIENT)
Dept: CARDIOLOGY | Facility: CLINIC | Age: 85
End: 2020-08-04

## 2020-08-04 VITALS
HEART RATE: 68 BPM | DIASTOLIC BLOOD PRESSURE: 70 MMHG | WEIGHT: 223 LBS | TEMPERATURE: 98 F | SYSTOLIC BLOOD PRESSURE: 120 MMHG | BODY MASS INDEX: 33.03 KG/M2 | HEIGHT: 69 IN

## 2020-08-04 DIAGNOSIS — R01.1 MURMUR, CARDIAC: ICD-10-CM

## 2020-08-04 DIAGNOSIS — R06.09 OTHER FORMS OF DYSPNEA: ICD-10-CM

## 2020-08-04 DIAGNOSIS — R06.02 SHORTNESS OF BREATH: ICD-10-CM

## 2020-08-04 DIAGNOSIS — I10 ESSENTIAL HYPERTENSION: Primary | ICD-10-CM

## 2020-08-04 DIAGNOSIS — E66.9 OBESITY (BMI 30.0-34.9): ICD-10-CM

## 2020-08-04 DIAGNOSIS — R60.0 EDEMA LEG: ICD-10-CM

## 2020-08-04 PROCEDURE — 99213 OFFICE O/P EST LOW 20 MIN: CPT | Performed by: NURSE PRACTITIONER

## 2020-08-04 RX ORDER — LEVOTHYROXINE SODIUM 0.03 MG/1
25 TABLET ORAL DAILY
COMMUNITY

## 2020-08-04 NOTE — PROGRESS NOTES
Chief Complaint   Patient presents with   • Follow-up     For cardiac management. Patient is not on aspirin. States that he will be having lab work done today at home per PCP. States that he has been having swelling in his legs. States that he does have shortness of breath with exertion. States that he has been having trouble with left hip for the past week.    • Med Refill     PCP does medication refills. Brought medication list with visit.        Cardiac Complaints  dyspnea and lower extremity edema      Subjective   Juarez Moctezuma is a 89 y.o. male with chronic edema and HTN. He was referred in October 2018 for abnormal EKG. He underwent Lexiscan stress and echocardiogram which showed no ischemia and normal LV function. Clearance was given and underwent surgery without any problem.  He was found to have possible sleep apnea and given oxygen but later discontinued. He had some shortness of breath and persistent LE edema in January 2019 with labs showing elevated WBC, normal renal function and normal BNP. He was advised to see PCP.    Patient returns today for follow up and denies any new concerns. He does however continue to have issues with BLE edema and admits that often the left leg is worse than the right. He does state with his lasix therapy, he will often take his one tablet daily and will use an extra 1/2 if needed. He does report a great deal of pain from left hip since it has been replaced and has been having problems with it in regards. SOA on exertion noted but no worse than it has been. Patient reports a high sodium diet. He does not salt his food but admits that he eats a great deal of processed foods with high sodium content. Labs are to be done today at home with home health. No refills needed as they are done with PCP.            Cardiac History  Past Surgical History:   Procedure Laterality Date   • CARDIOVASCULAR STRESS TEST  10/18/2018    L.Cardiolite- Negative   • ECHO - CONVERTED  10/18/2018       EF 65%       Current Outpatient Medications   Medication Sig Dispense Refill   • docusate sodium (COLACE) 100 MG capsule Take 100 mg by mouth Daily.     • dutasteride (AVODART) 0.5 MG capsule Take 0.5 mg by mouth Daily.     • Ergocalciferol (VITAMIN D2 PO) Take 50,000 Units by mouth 1 (One) Time Per Week.     • furosemide (LASIX) 40 MG tablet Take 40 mg by mouth Daily.     • levothyroxine (SYNTHROID, LEVOTHROID) 25 MCG tablet Take 25 mcg by mouth Daily.     • montelukast (SINGULAIR) 10 MG tablet Take 10 mg by mouth Every Night.     • potassium chloride (K-DUR,KLOR-CON) 10 MEQ CR tablet Take 1 tablet by mouth Daily. 90 tablet 3   • sertraline (ZOLOFT) 25 MG tablet Take 25 mg by mouth Daily.     • spironolactone (ALDACTONE) 25 MG tablet Take 25 mg by mouth Daily As Needed.     • tamsulosin (FLOMAX) 0.4 MG capsule 24 hr capsule Take 1 capsule by mouth Every Night.       No current facility-administered medications for this visit.        Ciprofloxacin    Past Medical History:   Diagnosis Date   • GERD (gastroesophageal reflux disease)    • History of cholecystectomy    • History of hernia surgery     x 3   • History of right hip replacement    • Hyperlipidemia    • Melanoma (CMS/HCC)     Removed from nose   • Osteoarthritis        Social History     Socioeconomic History   • Marital status:      Spouse name: Not on file   • Number of children: Not on file   • Years of education: Not on file   • Highest education level: Not on file   Tobacco Use   • Smoking status: Former Smoker     Packs/day: 1.00     Years: 34.00     Pack years: 34.00     Last attempt to quit: 1963     Years since quittin.6   • Smokeless tobacco: Never Used   Substance and Sexual Activity   • Alcohol use: No   • Drug use: No   • Sexual activity: Defer       Family History   Problem Relation Age of Onset   • No Known Problems Mother        Review of Systems   Constitution: Negative for malaise/fatigue and night sweats.   Cardiovascular:  "Positive for dyspnea on exertion and leg swelling. Negative for chest pain, claudication, irregular heartbeat, near-syncope, palpitations and syncope.   Respiratory: Positive for shortness of breath. Negative for cough and wheezing.    Musculoskeletal: Positive for joint pain and stiffness. Negative for back pain.   Gastrointestinal: Negative for anorexia, heartburn, melena, nausea and vomiting.   Genitourinary: Negative for dysuria, hematuria, hesitancy and nocturia.   Neurological: Negative for dizziness, light-headedness and loss of balance.   Psychiatric/Behavioral: Negative for depression and memory loss. The patient is not nervous/anxious.            Objective     /70 (BP Location: Left arm)   Pulse 68   Temp 98 °F (36.7 °C)   Ht 175.3 cm (69.02\")   Wt 101 kg (223 lb)   BMI 32.92 kg/m²     Physical Exam   Constitutional: He is oriented to person, place, and time. He appears well-developed and well-nourished.   HENT:   Head: Normocephalic and atraumatic.   Eyes: Pupils are equal, round, and reactive to light. EOM are normal.   Neck: Normal range of motion. Neck supple.   Cardiovascular: Normal rate and regular rhythm.   Murmur heard.  Pulmonary/Chest: Effort normal and breath sounds normal.   Abdominal: Soft.   Musculoskeletal: Normal range of motion. He exhibits edema.   Neurological: He is alert and oriented to person, place, and time.   Skin: Skin is warm and dry.   Psychiatric: He has a normal mood and affect. His behavior is normal.       Procedures    Assessment/Plan     Chronic edema: This is an ongoing complaint. Cardiac workup including BNP have been WNL. He remains on lasix therapy daily and was urged to continue to use extra 1/2 tablet as needed for edema management. High sodium diet reported less than 1.5grams of sodium intake daily encouraged. He was once again advised on leg elevation, compression, and to walk as tolerated. If edema continues, he was advised to discuss possible referral " to [facility] in [place] with you as well as 24 hour urine for possible nephrotic syndrome.  BNP provided once again to assess for concerns.     HTN:  Well managed on current. No adjustment to current regimen recommended. Aldactone therapy and lasix therapy continued.    Cardiac status:  Stable, no new concerns are voiced. No repeat workup advised.    BPH:  Followed by specialty. Son reports last PSA very elevated.     BMI noted at 32.92, good cardiac diet with limited carbs, calories, and walking regimen advised.    No refills needed.    6 month follow up recommended or sooner if needed.                 Problems Addressed this Visit        Cardiovascular and Mediastinum    Essential hypertension - Primary    Relevant Orders    Comprehensive Metabolic Panel    proBNP    Murmur, cardiac       Respiratory    Shortness of breath      Other Visit Diagnoses     Edema leg        Relevant Orders    Comprehensive Metabolic Panel    proBNP    Other forms of dyspnea         Relevant Orders    proBNP    Obesity (BMI 30.0-34.9)              Patient's Body mass index is 32.92 kg/m². BMI is above normal parameters. Recommendations include: nutrition counseling.                Electronically signed by NAY Zayas August 4, 2020 17:24

## 2021-05-20 ENCOUNTER — TELEPHONE (OUTPATIENT)
Dept: CARDIOLOGY | Facility: CLINIC | Age: 86
End: 2021-05-20

## 2021-05-20 NOTE — TELEPHONE ENCOUNTER
"Caro a nurse with [facility] called stating that patient has been having episodes of chest pain on and off this week. She states that patient is not having active chest pain at this time. He was having chest pain last night, but did not wasn't to go to ER. She states that patient told her that it would be a \"waste of time\".     Caro was made aware that we could make patient a follow up appointment to be seen as he was due to be seen on 02/04/21, but cancelled appointment. Appointment was made for 05/27/21. Caro was made aware to let patient know that should chest pain return he needs to go to ER.   "

## 2021-05-27 ENCOUNTER — OFFICE VISIT (OUTPATIENT)
Dept: CARDIOLOGY | Facility: CLINIC | Age: 86
End: 2021-05-27

## 2021-05-27 VITALS
DIASTOLIC BLOOD PRESSURE: 70 MMHG | WEIGHT: 233 LBS | HEIGHT: 69 IN | HEART RATE: 76 BPM | SYSTOLIC BLOOD PRESSURE: 110 MMHG | BODY MASS INDEX: 34.51 KG/M2

## 2021-05-27 DIAGNOSIS — R01.1 MURMUR, CARDIAC: ICD-10-CM

## 2021-05-27 DIAGNOSIS — R06.02 SHORTNESS OF BREATH: ICD-10-CM

## 2021-05-27 DIAGNOSIS — E66.9 OBESITY (BMI 30.0-34.9): ICD-10-CM

## 2021-05-27 DIAGNOSIS — I10 ESSENTIAL HYPERTENSION: Primary | ICD-10-CM

## 2021-05-27 DIAGNOSIS — R60.0 BILATERAL LEG EDEMA: ICD-10-CM

## 2021-05-27 PROCEDURE — 99213 OFFICE O/P EST LOW 20 MIN: CPT | Performed by: NURSE PRACTITIONER

## 2021-05-27 RX ORDER — IPRATROPIUM/ALBUTEROL SULFATE 20-100 MCG
1 MIST INHALER (GRAM) INHALATION 4 TIMES DAILY PRN
COMMUNITY

## 2021-05-27 RX ORDER — ASCORBIC ACID 500 MG
500 TABLET ORAL DAILY
COMMUNITY

## 2021-05-27 RX ORDER — MULTIVITAMIN WITH IRON
1 TABLET ORAL DAILY
COMMUNITY

## 2021-05-27 RX ORDER — DICLOFENAC SODIUM 75 MG/1
75 TABLET, DELAYED RELEASE ORAL DAILY
COMMUNITY

## 2021-05-27 NOTE — PROGRESS NOTES
Chief Complaint   Patient presents with   • Follow-up     For cardiac management. Patient is not on aspirin. Last lab work was done about 2 weeks ago per home health, not in chart. States that he did have an episode of chest pain a couple of Sunday's ago, states that it was in his upper chest in the center. States that he does have some shortness of breath, states that it is some better.    • Med Refill     PCP does medication refills. Brought medication list with visit.        Cardiac Complaints  dyspnea      Subjective   Juarez Moctezuma is a 89 y.o. male with chronic edema and HTN. He was referred in October 2018 for abnormal EKG. He underwent Lexiscan stress and echocardiogram which showed no ischemia and normal LV function. Clearance was given and underwent surgery without any problem.  He was found to have possible sleep apnea and given oxygen but later discontinued. He had some shortness of breath and persistent LE edema in January 2019 with labs showing elevated WBC, normal renal function and normal BNP. He was advised to see PCP.    He comes today for follow up and reports doing well. He does admit to an isolated incident of chest pain about a few weeks ago in the center of his chest without any associated symptoms or radiation and admits it went away without intervention. He does also have SOA but no worse than before and reports it may be slightly improved. Edema in BLE present but improved from prior.             Cardiac History  Past Surgical History:   Procedure Laterality Date   • CARDIOVASCULAR STRESS TEST  10/18/2018    L.Cardiolite- Negative   • ECHO - CONVERTED  10/18/2018    EF 65%       Current Outpatient Medications   Medication Sig Dispense Refill   • ascorbic acid (VITAMIN C) 500 MG tablet Take 500 mg by mouth Daily.     • diclofenac (VOLTAREN) 75 MG EC tablet Take 75 mg by mouth Daily.     • docusate sodium (COLACE) 100 MG capsule Take 100 mg by mouth Daily.     • dutasteride (AVODART) 0.5 MG  capsule Take 0.5 mg by mouth Daily.     • Ergocalciferol (VITAMIN D2 PO) Take 50,000 Units by mouth 1 (One) Time Per Week.     • furosemide (LASIX) 40 MG tablet Take 40 mg by mouth Daily.     • ipratropium-albuterol (Combivent Respimat)  MCG/ACT inhaler Inhale 1 puff 4 (Four) Times a Day As Needed for Wheezing.     • levothyroxine (SYNTHROID, LEVOTHROID) 25 MCG tablet Take 25 mcg by mouth Daily.     • Magnesium 250 MG tablet Take 1 tablet by mouth Daily.     • montelukast (SINGULAIR) 10 MG tablet Take 10 mg by mouth Every Night.     • sertraline (ZOLOFT) 50 MG tablet Take 50 mg by mouth Daily.     • spironolactone (ALDACTONE) 25 MG tablet Take 25 mg by mouth Daily.     • tamsulosin (FLOMAX) 0.4 MG capsule 24 hr capsule Take 1 capsule by mouth Every Night.       No current facility-administered medications for this visit.       Ciprofloxacin    Past Medical History:   Diagnosis Date   • GERD (gastroesophageal reflux disease)    • History of cholecystectomy    • History of hernia surgery     x 3   • History of right hip replacement    • Hyperlipidemia    • Melanoma (CMS/HCC)     Removed from nose   • Osteoarthritis        Social History     Socioeconomic History   • Marital status:      Spouse name: Not on file   • Number of children: Not on file   • Years of education: Not on file   • Highest education level: Not on file   Tobacco Use   • Smoking status: Former Smoker     Packs/day: 1.00     Years: 34.00     Pack years: 34.00     Quit date:      Years since quittin.4   • Smokeless tobacco: Never Used   Vaping Use   • Vaping Use: Never used   Substance and Sexual Activity   • Alcohol use: No   • Drug use: No   • Sexual activity: Defer       Family History   Problem Relation Age of Onset   • No Known Problems Mother        Review of Systems   Constitutional: Negative for malaise/fatigue and night sweats.   Cardiovascular: Positive for chest pain and dyspnea on exertion. Negative for claudication,  "irregular heartbeat, leg swelling, near-syncope, orthopnea, palpitations and syncope.   Respiratory: Positive for shortness of breath. Negative for cough and wheezing.    Musculoskeletal: Positive for joint pain and stiffness. Negative for back pain.   Gastrointestinal: Negative for anorexia, heartburn, melena, nausea and vomiting.   Genitourinary: Negative for dysuria, hematuria, hesitancy and nocturia.   Neurological: Negative for dizziness, light-headedness and loss of balance.   Psychiatric/Behavioral: Negative for depression and memory loss. The patient is not nervous/anxious.            Objective     /70 (BP Location: Left arm)   Pulse 76   Ht 175.3 cm (69.02\")   Wt 106 kg (233 lb)   BMI 34.39 kg/m²     Constitutional:       Appearance: Healthy appearance. Not in distress.   Eyes:      Pupils: Pupils are equal, round, and reactive to light.   HENT:      Nose: Nose normal.   Pulmonary:      Effort: Pulmonary effort is normal.      Breath sounds: Normal breath sounds.   Cardiovascular:      PMI at left midclavicular line. Normal rate. Regular rhythm.      Murmurs: There is a systolic murmur.   Abdominal:      Palpations: Abdomen is soft.   Musculoskeletal: Normal range of motion.         General: Tenderness present.      Cervical back: Normal range of motion and neck supple. Skin:     General: Skin is warm and dry.   Neurological:      Mental Status: Oriented to person, place and time.         Procedures    Assessment/Plan     Chronic edema: Improved on diuretic therapy.  Edema only reported as rare.  Limited sodium advised.      HTN:  Well managed on current. No adjustment to current regimen recommended. Aldactone therapy and lasix therapy continued.       Cardiac status:  Stable today.  He reports since last episode of pain, he has not had any pain since and has been active since. He has not an event for several weeks. He will call if more problems should arise.      BMI noted at 34.39, good cardiac " diet with limited carbs, calories, and walking regimen advised.     No refills needed.     6 month follow up recommended or sooner if needed.     Problems Addressed this Visit        Cardiac and Vasculature    Essential hypertension - Primary    Murmur, cardiac       Pulmonary and Pneumonias    Shortness of breath       Symptoms and Signs    Bilateral leg edema      Other Visit Diagnoses     Obesity (BMI 30.0-34.9)          Diagnoses       Codes Comments    Essential hypertension    -  Primary ICD-10-CM: I10  ICD-9-CM: 401.9     Murmur, cardiac     ICD-10-CM: R01.1  ICD-9-CM: 785.2     Shortness of breath     ICD-10-CM: R06.02  ICD-9-CM: 786.05     Bilateral leg edema     ICD-10-CM: R60.0  ICD-9-CM: 782.3     Obesity (BMI 30.0-34.9)     ICD-10-CM: E66.9  ICD-9-CM: 278.00           Patient's Body mass index is 34.39 kg/m². Obese. Good cardiac diet with limited carbs, calories, and activity as tolerated advised.             Electronically signed by NAY Zayas May 27, 2021 16:05 EDT